# Patient Record
Sex: FEMALE | Race: WHITE | NOT HISPANIC OR LATINO | Employment: OTHER | URBAN - METROPOLITAN AREA
[De-identification: names, ages, dates, MRNs, and addresses within clinical notes are randomized per-mention and may not be internally consistent; named-entity substitution may affect disease eponyms.]

---

## 2019-07-22 NOTE — PROCEDURE NOTE: SURGICAL
<p></p><p>Prior to commencing surgery patient identification, surgical procedure, site, and side were confirmed by Dr. Pena.&nbsp; Following topical proparacaine anesthesia, the patient was positioned at the YAG laser, a contact lens coupled to the cornea of the right eye with methylcellulose and an axial posterior capsulotomy performed without complication using 3.6 Mj x 28. Attention was then turned to the left eye and a contact lens coupled to the cornea of the left eye with methylcellulose and an axial posterior capsulotomy performed without complication using 3.5 Mj x 35. One drop of Alphagan was instilled in both eyes and the patient returned to the holding area having tolerated the procedure well and without complication. </p><p>MRN:[id number]</p><p></p>

## 2019-08-22 NOTE — PATIENT DISCUSSION
Artificial Tears: One drop to both eyes 3-4 times daily. We recommend Systane or Refresh lubricating eye drops which can be found at any pharmacy.
Blepharitis instruction sheet given.
Continue current management.
Monitor.
Patient understands condition, prognosis and need for follow up care.
Patient understands there is an increased risk of corneal edema after cataract surgery.
Recommended Observation.
Recommended artificial tears to use as directed.
Recommended lid scrubs.
Recommended warm compresses.
Stable.
The patient is status post Yag laser Capsulotomy in the left eye. The vision shows nice improvement.
The patient is status post Yag laser Capsulotomy in the right eye. The vision shows nice improvement.
hold for now.
maxitrol BID OU.
toric OS if changes mind.
no

## 2020-10-06 ENCOUNTER — NEW PATIENT COMPREHENSIVE (OUTPATIENT)
Dept: URBAN - METROPOLITAN AREA CLINIC 36 | Facility: CLINIC | Age: 60
End: 2020-10-06

## 2020-10-06 DIAGNOSIS — H52.7: ICD-10-CM

## 2020-10-06 DIAGNOSIS — H25.811: ICD-10-CM

## 2020-10-06 DIAGNOSIS — H04.123: ICD-10-CM

## 2020-10-06 DIAGNOSIS — H25.812: ICD-10-CM

## 2020-10-06 PROCEDURE — 92004 COMPRE OPH EXAM NEW PT 1/>: CPT

## 2020-10-06 PROCEDURE — 92015 DETERMINE REFRACTIVE STATE: CPT

## 2020-10-06 ASSESSMENT — VISUAL ACUITY
OS_CC: 20/25-1
OD_SC: 20/50-2
OD_SC: J2
OS_SC: J2
OD_CC: J2
OD_CC: 20/25-2
OS_CC: J2
OS_SC: 20/60

## 2020-10-06 ASSESSMENT — TONOMETRY
OS_IOP_MMHG: 14
OD_IOP_MMHG: 14

## 2021-10-13 ENCOUNTER — ESTABLISHED COMPREHENSIVE EXAM (OUTPATIENT)
Dept: URBAN - METROPOLITAN AREA CLINIC 36 | Facility: CLINIC | Age: 61
End: 2021-10-13

## 2021-10-13 DIAGNOSIS — H52.7: ICD-10-CM

## 2021-10-13 PROCEDURE — 92015 DETERMINE REFRACTIVE STATE: CPT

## 2021-10-13 PROCEDURE — 92014 COMPRE OPH EXAM EST PT 1/>: CPT

## 2021-10-13 ASSESSMENT — VISUAL ACUITY
OD_CC: 20/20-1
OS_SC: 20/80
OS_CC: J3
OD_SC: J3
OD_SC: 20/60-2
OD_CC: J2
OS_SC: J2
OS_CC: 20/20

## 2021-10-13 ASSESSMENT — TONOMETRY
OS_IOP_MMHG: 15
OD_IOP_MMHG: 15

## 2022-10-13 ENCOUNTER — COMPREHENSIVE EXAM (OUTPATIENT)
Dept: URBAN - METROPOLITAN AREA CLINIC 47 | Facility: CLINIC | Age: 62
End: 2022-10-13

## 2022-10-13 DIAGNOSIS — H04.123: ICD-10-CM

## 2022-10-13 DIAGNOSIS — H52.7: ICD-10-CM

## 2022-10-13 DIAGNOSIS — H25.813: ICD-10-CM

## 2022-10-13 PROCEDURE — 92015 DETERMINE REFRACTIVE STATE: CPT

## 2022-10-13 PROCEDURE — 92014 COMPRE OPH EXAM EST PT 1/>: CPT

## 2022-10-13 ASSESSMENT — VISUAL ACUITY
OD_SC: 20/20-2
OU_SC: J2
OS_SC: 20/25

## 2022-10-13 ASSESSMENT — TONOMETRY
OD_IOP_MMHG: 12
OS_IOP_MMHG: 14

## 2022-11-10 NOTE — PATIENT DISCUSSION
Sending in TobraDex Ointment to [employer]. Pt is to use 1/4 in strip t.i.d. x 7 days.

## 2023-01-05 NOTE — PATIENT DISCUSSION
Referred by: Rob Ramsey MD; Medical Diagnosis (from order):    Diagnosis Information      Diagnosis    V43.65 (ICD-9-CM) - Z96.651 (ICD-10-CM) - Status post right knee replacement    Z96.651 (ICD-10-CM) - Presence of right artificial knee joint                Daily Treatment Note -  Physical Therapy    Visit:  11   Next referring provider appointment: 7/9/2021        SUBJECTIVE                                                                                                             Patient is 8 weeks 1 day s/p right total knee arthroplasty. Patient notes her walker was in the wrong car, so she doesn't have it. Her cane is in a different car. She had someone else drive her to therapy today. She does not have an assistive device with her today. She feels she is doing ok without it.     Her right knee is still about 3/10. She states she is having bilateral leg cramps.   Pain / Symptoms:  Pain rating (out of 10): Current: 3 (with movement) ; Best: 0; Worst: 5Location: Right knee     OBJECTIVE                                                                                                                     Range of Motion (ROM)   (degrees unless noted; active unless noted; norms in ( ); negative=lacking to 0, positive=beyond 0)   Knee:    - Flexion (150):        • Right: 122    - Extension (0-10):        • Right: 0      TREATMENT                                                                                                                  Therapeutic Exercise:  Recumbent bike warm up  - Seat 3, L0 x 5 min  Passive right knee flexion stretching  Seated knee extension   - 2 x 10 with 3-5 sec hold with 2# ankle weight, right   Standing heel/toe raises   - 2 x 10   - cues to not stick out her butt  Standing knee flexion  - 2 x 10 with 2# ankle weight, right   -needs verbal and tactile cues to not hike hip,   Prone knee flex  - 1 x 10 with 2# ankle weight, right   Prone hip extension   - 1 x 10, right   SL  Updated glasses Rx today. clamshells with green theraband   - 2 x 10 with 5 sec hold, right       Neuromuscular Re-Education:  Tandem stance balance   - 1 x 30 sec bilateral with one hand/fingertips on table next her  Tandem stance balance with cervical rotation  - 1 x 10 head turns to each side, bilateral with one hand/fingertips on the table  Tandem stance balance with head up/down  - 1 x 10 bilateral with one hand/fingertips on table  Tandem walking along table, forward  - x 4    Forward/backward walking (trying to take equal steps)  Trial 1  -10 steps forward, 18 steps backward  Trail 2  -10 steps forward, 14 steps backward  Trial 3  -9 steps forward, 13 steps backward    Skilled input: verbal instruction/cues, tactile instruction/cues, as detailed above and posture correction    Writer verbally educated and received verbal consent for hand placement, positioning of patient, and techniques to be performed today from patient for therapist position for techniques and hand placement and palpation for techniques as described above and how they are pertinent to the patient's plan of care.    Home Exercise Program: Access Code: [email or web address]  URL: [email or web address]  Date: 07/20/2021  Prepared by: Sonia Zavala    Exercises  Supine Ankle Pumps - 3 x daily - 7 x weekly - 1 sets - 15 reps  Supine Heel Slide with Strap - 2 x daily - 7 x weekly - 1 sets - 15 reps  Small Range Straight Leg Raise - 1-2 x daily - 7 x weekly - 1 sets - 10 reps  Supine Knee Extension Stretch on Towel Roll - 1 x daily - 7 x weekly - 1 reps - 1 sets - 5-10min hold  Supine Bridge - 1 x daily - 7 x weekly - 1-2 sets - 10 reps - 3-5 hold  Seated Long Arc Quad - 2 x daily - 7 x weekly - 2-3 sets - 10 reps - 3-5 sec hold  Side Stepping with Counter Support - 1 x daily - 7 x weekly - 2 sets - 5 reps  Heel Toe Raises with Counter Support - 2 x daily - 7 x weekly - 2-3 sets - 10 reps  Tandem Stance with Support - 2-3 x daily - 7 x weekly - 1 sets - 3 reps  - 30 hold  Standing Knee Flexion with Counter Support - 1 x daily - 7 x weekly - 1-2 sets - 10 reps - 2-3 sec hold  Standing Hip Abduction with Counter Support - 1 x daily - 7 x weekly - 1-2 sets - 10 reps  Sit to Stand - 2 x daily - 7 x weekly - 1 sets - 5-10 reps  Prone Knee Flexion - 1 x daily - 3-4 x weekly - 2-3 sets - 10 reps  Prone Hip Extension - 1 x daily - 3-4 x weekly - 2-3 sets - 10 reps  Clamshell - 1 x daily - 3-4 x weekly - 2-3 sets - 10 reps - 5 hold       ASSESSMENT                                                                                                             Patient continues to have good right knee ROM. She was able to add some weight to some of the exercises today. Patient reports no significant increase in pain with exercises today. Patient is a little guarded with walking today and notes she feels a little uncomfortable not having an assisted device today, but felt it is manageable. The walker gives her more comfort with walking because she knows she has stability.   Pain/symptoms after session (out of 10): 3    Patient Education:   Results of above outlined education: Verbalizes understanding, Demonstrates understanding and Needs reinforcement      PLAN                                                                                                                           Suggestions for next session as indicated: Progress per plan of care, Focus on knee ROM, quadriceps and hip strengthening exercises, balance and stability exercises, gait training with least assistive device.            Therapy procedure time and total treatment time can be found documented on the Time Entry flowsheet

## 2023-10-20 ENCOUNTER — COMPREHENSIVE EXAM (OUTPATIENT)
Dept: URBAN - METROPOLITAN AREA CLINIC 36 | Facility: CLINIC | Age: 63
End: 2023-10-20

## 2023-10-20 DIAGNOSIS — H04.123: ICD-10-CM

## 2023-10-20 DIAGNOSIS — H25.813: ICD-10-CM

## 2023-10-20 DIAGNOSIS — H52.7: ICD-10-CM

## 2023-10-20 PROCEDURE — 92014 COMPRE OPH EXAM EST PT 1/>: CPT

## 2023-10-20 PROCEDURE — 92015 DETERMINE REFRACTIVE STATE: CPT

## 2023-10-20 ASSESSMENT — VISUAL ACUITY
OS_SC: 20/60+2
OD_SC: 20/25
OS_SC: J6
OD_SC: J10
OS_PH: 20/25

## 2023-10-20 ASSESSMENT — TONOMETRY
OS_IOP_MMHG: 12
OD_IOP_MMHG: 12

## 2024-11-07 ENCOUNTER — COMPREHENSIVE EXAM (OUTPATIENT)
Dept: URBAN - METROPOLITAN AREA CLINIC 36 | Facility: CLINIC | Age: 64
End: 2024-11-07

## 2024-11-07 DIAGNOSIS — H25.813: ICD-10-CM

## 2024-11-07 DIAGNOSIS — H04.123: ICD-10-CM

## 2024-11-07 DIAGNOSIS — H52.7: ICD-10-CM

## 2024-11-07 PROCEDURE — 92014 COMPRE OPH EXAM EST PT 1/>: CPT

## 2024-11-07 PROCEDURE — 92015 DETERMINE REFRACTIVE STATE: CPT
